# Patient Record
Sex: FEMALE | Race: OTHER | NOT HISPANIC OR LATINO | URBAN - METROPOLITAN AREA
[De-identification: names, ages, dates, MRNs, and addresses within clinical notes are randomized per-mention and may not be internally consistent; named-entity substitution may affect disease eponyms.]

---

## 2019-10-28 VITALS
OXYGEN SATURATION: 100 % | RESPIRATION RATE: 16 BRPM | TEMPERATURE: 99 F | SYSTOLIC BLOOD PRESSURE: 113 MMHG | DIASTOLIC BLOOD PRESSURE: 75 MMHG | HEART RATE: 76 BPM

## 2019-10-28 RX ORDER — IBUPROFEN 200 MG
0 TABLET ORAL
Qty: 0 | Refills: 0 | DISCHARGE

## 2019-10-28 NOTE — H&P ADULT - ASSESSMENT
29 y/o Female with Morphine allergy (hives) and PMHx of RUE AVM who presents to [facility] for recommended DSE of RUE AVM. Pt reports she has had a DSE of her RUE AVM once in the past at age 12. She notes that her only complaints with regard to her AVM are the inability to straighten her right arm as a result of her previous procedure and pain described as a "HA in her arm" in her axilla and antecubital fossa. She also notes that if she avoids high inflammation foods, late night eating, and ETOH, her pain is typically well controlled. She takes Advil PRN for pain. Pt denies skin changes, diminished strength, numbness, tingling, swelling, or any other changes at this time. She underwent an MRI Humerus w/, w/o contrast on 6/25/19 revealing large AVM extending from lateral border of the pectoralis major muscle through the upper arm and midarm along the medial and posterior borders of the biceps muscle and insinuating muscle itself, which compared to a previous study in 2002 appears smaller but still present. Pt now presents to [facility] for DSE of RUE AVM with Dr. Peña.    [facility] 10/28/19; [facility] [place], [place] ..   Pt. to be consented for procedure by Dr. Peña's team. 29 y/o Female with Morphine allergy (hives) and PMHx of RUE AVM who presents to [facility] for recommended DSE of RUE AVM. Pt reports she has had a DSE of her RUE AVM once in the past at age 12. She notes that her only complaints with regard to her AVM are the inability to straighten her right arm as a result of her previous procedure and pain described as a "HA in her arm" in her axilla and antecubital fossa. She also notes that if she avoids high inflammation foods, late night eating, and ETOH, her pain is typically well controlled. She takes Advil PRN for pain. Pt denies skin changes, diminished strength, numbness, tingling, swelling, or any other changes at this time. She underwent an MRI Humerus w/, w/o contrast on 6/25/19 revealing large AVM extending from lateral border of the pectoralis major muscle through the upper arm and midarm along the medial and posterior borders of the biceps muscle and insinuating muscle itself, which compared to a previous study in 2002 appears smaller but still present. Pt now presents to [facility] for DSE of RUE AVM with Dr. Peña.    [facility] 10/28/19; [place] [facility] negative.   Pt. to be consented for procedure by Dr. Peña's team.

## 2019-10-28 NOTE — H&P ADULT - HISTORY OF PRESENT ILLNESS
29 y/o Female with Morphine allergy (hives) and PMHx of RUE AVM who presents to [facility] for recommended DSE of RUE AVM. Pt reports she has had a DSE of her RUE AVM once in the past at age 12. She notes that her only complaints with regard to her AVM are the inability to straighten her right arm as a result of her previous procedure and pain described as a "HA in her arm" in her axilla and antecubital fossa. She also notes that if she avoids high inflammation foods, late night eating, and ETOH, her pain is typically well controlled. She takes Advil PRN for pain. Pt denies skin changes, diminished strength, numbness, tingling, swelling, or any other changes at this time. She underwent an MRI Humerus w/, w/o contrast on 6/25/19 revealing large AVM extending from lateral border of the pectoralis major muscle through the upper arm and midarm along the medial and posterior borders of the biceps muscle and insinuating muscle itself, which compared to a previous study in 2002 appears smaller but still present.  Pt now presents to [facility] for DSE of RUE AVM with Dr. Peña.    [facility] 10/28/19

## 2019-10-28 NOTE — H&P ADULT - NSHPLABSRESULTS_GEN_ALL_CORE
13.6   5.98  )-----------( 187      ( 29 Oct 2019 09:34 )             39.3   PT/INR - ( 29 Oct 2019 09:34 )   PT: 11.2 sec;   INR: 0.99          PTT - ( 29 Oct 2019 09:34 )  PTT:28.0 sec 13.6   5.98  )-----------( 187      ( 29 Oct 2019 09:34 )             39.3   PT/INR - ( 29 Oct 2019 09:34 )   PT: 11.2 sec;   INR: 0.99          PTT - ( 29 Oct 2019 09:34 )  PTT:28.0 sec    10-29    141  |  106  |  11  ----------------------------<  88  3.8   |  25  |  0.79    Ca    8.7      29 Oct 2019 09:34    TPro  6.4  /  Alb  4.3  /  TBili  0.4  /  DBili  x   /  AST  14  /  ALT  14  /  AlkPhos  60  10-29

## 2019-10-28 NOTE — H&P ADULT - RS GEN PE MLT RESP DETAILS PC
normal/airway patent/no chest wall tenderness/no intercostal retractions/no rales/no subcutaneous emphysema/no rhonchi/breath sounds equal/clear to auscultation bilaterally/good air movement/respirations non-labored

## 2019-10-29 ENCOUNTER — INPATIENT (INPATIENT)
Facility: HOSPITAL | Age: 29
LOS: 0 days | Discharge: HOME | DRG: 254 | End: 2019-10-30
Attending: RADIOLOGY | Admitting: RADIOLOGY
Payer: COMMERCIAL

## 2019-10-29 DIAGNOSIS — Z98.890 OTHER SPECIFIED POSTPROCEDURAL STATES: Chronic | ICD-10-CM

## 2019-10-29 LAB
ALBUMIN SERPL ELPH-MCNC: 4.3 G/DL — SIGNIFICANT CHANGE UP (ref 3.3–5)
ALP SERPL-CCNC: 60 U/L — SIGNIFICANT CHANGE UP (ref 40–120)
ALT FLD-CCNC: 14 U/L — SIGNIFICANT CHANGE UP (ref 10–45)
ANION GAP SERPL CALC-SCNC: 10 MMOL/L — SIGNIFICANT CHANGE UP (ref 5–17)
APTT BLD: 28 SEC — SIGNIFICANT CHANGE UP (ref 27.5–36.3)
AST SERPL-CCNC: 14 U/L — SIGNIFICANT CHANGE UP (ref 10–40)
BASOPHILS # BLD AUTO: 0.02 K/UL — SIGNIFICANT CHANGE UP (ref 0–0.2)
BASOPHILS NFR BLD AUTO: 0.3 % — SIGNIFICANT CHANGE UP (ref 0–2)
BILIRUB SERPL-MCNC: 0.4 MG/DL — SIGNIFICANT CHANGE UP (ref 0.2–1.2)
BUN SERPL-MCNC: 11 MG/DL — SIGNIFICANT CHANGE UP (ref 7–23)
CALCIUM SERPL-MCNC: 8.7 MG/DL — SIGNIFICANT CHANGE UP (ref 8.4–10.5)
CHLORIDE SERPL-SCNC: 106 MMOL/L — SIGNIFICANT CHANGE UP (ref 96–108)
CO2 SERPL-SCNC: 25 MMOL/L — SIGNIFICANT CHANGE UP (ref 22–31)
CREAT SERPL-MCNC: 0.79 MG/DL — SIGNIFICANT CHANGE UP (ref 0.5–1.3)
EOSINOPHIL # BLD AUTO: 0.12 K/UL — SIGNIFICANT CHANGE UP (ref 0–0.5)
EOSINOPHIL NFR BLD AUTO: 2 % — SIGNIFICANT CHANGE UP (ref 0–6)
GLUCOSE SERPL-MCNC: 88 MG/DL — SIGNIFICANT CHANGE UP (ref 70–99)
HCG SERPL-ACNC: <0 MIU/ML — SIGNIFICANT CHANGE UP
HCT VFR BLD CALC: 39.3 % — SIGNIFICANT CHANGE UP (ref 34.5–45)
HGB BLD-MCNC: 13.6 G/DL — SIGNIFICANT CHANGE UP (ref 11.5–15.5)
IMM GRANULOCYTES NFR BLD AUTO: 0.3 % — SIGNIFICANT CHANGE UP (ref 0–1.5)
INR BLD: 0.99 — SIGNIFICANT CHANGE UP (ref 0.88–1.16)
LYMPHOCYTES # BLD AUTO: 2.79 K/UL — SIGNIFICANT CHANGE UP (ref 1–3.3)
LYMPHOCYTES # BLD AUTO: 46.7 % — HIGH (ref 13–44)
MCHC RBC-ENTMCNC: 30.1 PG — SIGNIFICANT CHANGE UP (ref 27–34)
MCHC RBC-ENTMCNC: 34.6 GM/DL — SIGNIFICANT CHANGE UP (ref 32–36)
MCV RBC AUTO: 86.9 FL — SIGNIFICANT CHANGE UP (ref 80–100)
MONOCYTES # BLD AUTO: 0.45 K/UL — SIGNIFICANT CHANGE UP (ref 0–0.9)
MONOCYTES NFR BLD AUTO: 7.5 % — SIGNIFICANT CHANGE UP (ref 2–14)
NEUTROPHILS # BLD AUTO: 2.58 K/UL — SIGNIFICANT CHANGE UP (ref 1.8–7.4)
NEUTROPHILS NFR BLD AUTO: 43.2 % — SIGNIFICANT CHANGE UP (ref 43–77)
NRBC # BLD: 0 /100 WBCS — SIGNIFICANT CHANGE UP (ref 0–0)
PLATELET # BLD AUTO: 187 K/UL — SIGNIFICANT CHANGE UP (ref 150–400)
POTASSIUM SERPL-MCNC: 3.8 MMOL/L — SIGNIFICANT CHANGE UP (ref 3.5–5.3)
POTASSIUM SERPL-SCNC: 3.8 MMOL/L — SIGNIFICANT CHANGE UP (ref 3.5–5.3)
PROT SERPL-MCNC: 6.4 G/DL — SIGNIFICANT CHANGE UP (ref 6–8.3)
PROTHROM AB SERPL-ACNC: 11.2 SEC — SIGNIFICANT CHANGE UP (ref 10–12.9)
RBC # BLD: 4.52 M/UL — SIGNIFICANT CHANGE UP (ref 3.8–5.2)
RBC # FLD: 12.2 % — SIGNIFICANT CHANGE UP (ref 10.3–14.5)
SODIUM SERPL-SCNC: 141 MMOL/L — SIGNIFICANT CHANGE UP (ref 135–145)
WBC # BLD: 5.98 K/UL — SIGNIFICANT CHANGE UP (ref 3.8–10.5)
WBC # FLD AUTO: 5.98 K/UL — SIGNIFICANT CHANGE UP (ref 3.8–10.5)

## 2019-10-29 PROCEDURE — 76937 US GUIDE VASCULAR ACCESS: CPT | Mod: 26

## 2019-10-29 PROCEDURE — 37241 VASC EMBOLIZE/OCCLUDE VENOUS: CPT

## 2019-10-29 PROCEDURE — 93010 ELECTROCARDIOGRAM REPORT: CPT

## 2019-10-29 RX ORDER — KETOROLAC TROMETHAMINE 30 MG/ML
30 SYRINGE (ML) INJECTION EVERY 6 HOURS
Refills: 0 | Status: DISCONTINUED | OUTPATIENT
Start: 2019-10-29 | End: 2019-10-30

## 2019-10-29 RX ORDER — ONDANSETRON 8 MG/1
4 TABLET, FILM COATED ORAL EVERY 4 HOURS
Refills: 0 | Status: DISCONTINUED | OUTPATIENT
Start: 2019-10-29 | End: 2019-10-30

## 2019-10-29 RX ORDER — INFLUENZA VIRUS VACCINE 15; 15; 15; 15 UG/.5ML; UG/.5ML; UG/.5ML; UG/.5ML
0.5 SUSPENSION INTRAMUSCULAR ONCE
Refills: 0 | Status: DISCONTINUED | OUTPATIENT
Start: 2019-10-29 | End: 2019-10-30

## 2019-10-29 RX ORDER — CEFAZOLIN SODIUM 1 G
1000 VIAL (EA) INJECTION EVERY 8 HOURS
Refills: 0 | Status: DISCONTINUED | OUTPATIENT
Start: 2019-10-29 | End: 2019-10-30

## 2019-10-29 RX ADMIN — Medication 30 MILLIGRAM(S): at 16:47

## 2019-10-29 RX ADMIN — Medication 40 MILLIGRAM(S): at 21:54

## 2019-10-29 RX ADMIN — Medication 100 MILLIGRAM(S): at 22:00

## 2019-10-29 RX ADMIN — Medication 30 MILLIGRAM(S): at 16:13

## 2019-10-29 NOTE — BRIEF OPERATIVE NOTE - COMMENTS
Bedrest until fully awake then activity as tolerated. Prednisone at bedtime and in am. Ancef 1q q8h x 3 doses. Pain meds PRN.

## 2019-10-29 NOTE — BRIEF OPERATIVE NOTE - OPERATION/FINDINGS
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

## 2019-10-30 ENCOUNTER — TRANSCRIPTION ENCOUNTER (OUTPATIENT)
Age: 29
End: 2019-10-30

## 2019-10-30 VITALS — TEMPERATURE: 98 F

## 2019-10-30 LAB
ANION GAP SERPL CALC-SCNC: 11 MMOL/L — SIGNIFICANT CHANGE UP (ref 5–17)
BUN SERPL-MCNC: 12 MG/DL — SIGNIFICANT CHANGE UP (ref 7–23)
CALCIUM SERPL-MCNC: 8.8 MG/DL — SIGNIFICANT CHANGE UP (ref 8.4–10.5)
CHLORIDE SERPL-SCNC: 107 MMOL/L — SIGNIFICANT CHANGE UP (ref 96–108)
CO2 SERPL-SCNC: 22 MMOL/L — SIGNIFICANT CHANGE UP (ref 22–31)
CREAT SERPL-MCNC: 0.68 MG/DL — SIGNIFICANT CHANGE UP (ref 0.5–1.3)
GLUCOSE SERPL-MCNC: 143 MG/DL — HIGH (ref 70–99)
HCT VFR BLD CALC: 37.9 % — SIGNIFICANT CHANGE UP (ref 34.5–45)
HGB BLD-MCNC: 13 G/DL — SIGNIFICANT CHANGE UP (ref 11.5–15.5)
MCHC RBC-ENTMCNC: 29.8 PG — SIGNIFICANT CHANGE UP (ref 27–34)
MCHC RBC-ENTMCNC: 34.3 GM/DL — SIGNIFICANT CHANGE UP (ref 32–36)
MCV RBC AUTO: 86.9 FL — SIGNIFICANT CHANGE UP (ref 80–100)
NRBC # BLD: 0 /100 WBCS — SIGNIFICANT CHANGE UP (ref 0–0)
PLATELET # BLD AUTO: 171 K/UL — SIGNIFICANT CHANGE UP (ref 150–400)
POTASSIUM SERPL-MCNC: 4.3 MMOL/L — SIGNIFICANT CHANGE UP (ref 3.5–5.3)
POTASSIUM SERPL-SCNC: 4.3 MMOL/L — SIGNIFICANT CHANGE UP (ref 3.5–5.3)
RBC # BLD: 4.36 M/UL — SIGNIFICANT CHANGE UP (ref 3.8–5.2)
RBC # FLD: 11.9 % — SIGNIFICANT CHANGE UP (ref 10.3–14.5)
SODIUM SERPL-SCNC: 140 MMOL/L — SIGNIFICANT CHANGE UP (ref 135–145)
WBC # BLD: 12.42 K/UL — HIGH (ref 3.8–10.5)
WBC # FLD AUTO: 12.42 K/UL — HIGH (ref 3.8–10.5)

## 2019-10-30 PROCEDURE — 99238 HOSP IP/OBS DSCHRG MGMT 30/<: CPT

## 2019-10-30 RX ORDER — KETOROLAC TROMETHAMINE 30 MG/ML
1 SYRINGE (ML) INJECTION
Qty: 30 | Refills: 0
Start: 2019-10-30 | End: 2019-11-03

## 2019-10-30 RX ORDER — CEPHALEXIN 500 MG
1 CAPSULE ORAL
Qty: 28 | Refills: 0
Start: 2019-10-30 | End: 2019-11-05

## 2019-10-30 RX ADMIN — Medication 30 MILLIGRAM(S): at 06:41

## 2019-10-30 RX ADMIN — Medication 20 MILLIGRAM(S): at 11:22

## 2019-10-30 RX ADMIN — Medication 30 MILLIGRAM(S): at 06:56

## 2019-10-30 RX ADMIN — Medication 100 MILLIGRAM(S): at 06:40

## 2019-10-30 NOTE — DISCHARGE NOTE NURSING/CASE MANAGEMENT/SOCIAL WORK - PATIENT PORTAL LINK FT
You can access the FollowMyHealth Patient Portal offered by [facility] by registering at the following website: [email or web address]. By joining [facility]’s FollowMyHealth portal, you will also be able to view your health information using other applications (apps) compatible with our system.

## 2019-10-30 NOTE — DISCHARGE NOTE PROVIDER - HOSPITAL COURSE
27 y/o Female with Morphine allergy (hives) and PMHx of RUE AVM who presents to [facility] for recommended DSE of RUE AVM. Pt reports she has had a DSE of her RUE AVM once in the past at age 12. She notes that her only complaints with regard to her AVM are the inability to straighten her right arm as a result of her previous procedure and pain described as a "HA in her arm" in her axilla and antecubital fossa. She also notes that if she avoids high inflammation foods, late night eating, and ETOH, her pain is typically well controlled. She takes Advil PRN for pain. Pt denies skin changes, diminished strength, numbness, tingling, swelling, or any other changes at this time. She underwent an MRI Humerus w/, w/o contrast on 6/25/19 revealing large AVM extending from lateral border of the pectoralis major muscle through the upper arm and midarm along the medial and posterior borders of the biceps muscle and insinuating muscle itself, which compared to a previous study in 2002 appears smaller but still present. Pt now presents to [facility] for DSE of RUE AVM with Dr. Peña. Pt now s/p DSE right arm/axilla of venous malformation and admitted to [address] [place] [address] overnight for observation. Of note s/p prednisone 40 mg x1 and prednisone 20 mg x1. Pt e-prescribed prophylactic antibiotics keflex 500 q4hrs x7 day course.     Pt deemed stable for discharge per Dr. Peña and will follow up with Dr. Peña in 6 weeks. 27 y/o Female with Morphine allergy (hives) and PMHx of RUE AVM who presents to [facility] for recommended DSE of RUE AVM. Pt reports she has had a DSE of her RUE AVM once in the past at age 12. She notes that her only complaints with regard to her AVM are the inability to straighten her right arm as a result of her previous procedure and pain described as a "HA in her arm" in her axilla and antecubital fossa. She also notes that if she avoids high inflammation foods, late night eating, and ETOH, her pain is typically well controlled. She takes Advil PRN for pain. Pt denies skin changes, diminished strength, numbness, tingling, swelling, or any other changes at this time. She underwent an MRI Humerus w/, w/o contrast on 6/25/19 revealing large AVM extending from lateral border of the pectoralis major muscle through the upper arm and midarm along the medial and posterior borders of the biceps muscle and insinuating muscle itself, which compared to a previous study in 2002 appears smaller but still present. Pt now presents to [facility] for DSE of RUE AVM with Dr. Peña. Pt now s/p DSE right arm/axilla of venous malformation and admitted to [address] [place] [address] overnight for observation. Of note s/p prednisone 40 mg x1 and prednisone 20 mg x1. Pt e-prescribed prophylactic antibiotics keflex 500 q4hrs x7 day course and toradol 10 mg PO q4-6 hr PRN. Pt deemed stable for discharge per Dr. Peña and will follow up with Dr. Peña in 6 weeks.

## 2019-10-30 NOTE — DISCHARGE NOTE PROVIDER - CARE PROVIDER_API CALL
Art Peña)  Diagnostic Radiology  [address] [place] [address]  Phone: [phone]  Fax: [phone]  Follow Up Time:

## 2019-10-30 NOTE — DISCHARGE NOTE PROVIDER - NSDCCPCAREPLAN_GEN_ALL_CORE_FT
PRINCIPAL DISCHARGE DIAGNOSIS  Diagnosis: Venous malformation  Assessment and Plan of Treatment: You underwent a direct stick embolization to the venous malformation in your right arm. Please complete the 7 day course of prophylactic antibiotic keflex 500 mg 4 times a day. Please follow up with Dr. Peña in PRINCIPAL DISCHARGE DIAGNOSIS  Diagnosis: Venous malformation  Assessment and Plan of Treatment: You underwent a direct stick embolization to the venous malformation in your right arm. Please complete the 7 day course of prophylactic antibiotic keflex 500 mg 4 times a day. Toradol (ketorolac) 10 mg every 4-6 hours as needed for pain relief. Please do not take toradol and advil at the same time as it will increase the risk of bleeding. Please follow up with Dr. Peña in 6 weeks.

## 2019-10-30 NOTE — DISCHARGE NOTE PROVIDER - NSDCMRMEDTOKEN_GEN_ALL_CORE_FT
Advil 200 mg oral tablet: tab(s) orally every 6 hours, As Needed Advil 200 mg oral tablet: tab(s) orally every 6 hours, As Needed  Keflex 500 mg oral capsule: 1 cap(s) orally 4 times a day   ketorolac 10 mg oral tablet: 1 tab(s) orally every 4 hours, As Needed

## 2019-11-07 DIAGNOSIS — Z88.5 ALLERGY STATUS TO NARCOTIC AGENT: ICD-10-CM

## 2019-11-07 DIAGNOSIS — Q27.31 ARTERIOVENOUS MALFORMATION OF VESSEL OF UPPER LIMB: ICD-10-CM

## 2019-11-07 DIAGNOSIS — Z80.3 FAMILY HISTORY OF MALIGNANT NEOPLASM OF BREAST: ICD-10-CM

## 2019-12-05 PROCEDURE — 85610 PROTHROMBIN TIME: CPT

## 2019-12-05 PROCEDURE — 80048 BASIC METABOLIC PNL TOTAL CA: CPT

## 2019-12-05 PROCEDURE — 84702 CHORIONIC GONADOTROPIN TEST: CPT

## 2019-12-05 PROCEDURE — 93005 ELECTROCARDIOGRAM TRACING: CPT

## 2019-12-05 PROCEDURE — 85025 COMPLETE CBC W/AUTO DIFF WBC: CPT

## 2019-12-05 PROCEDURE — A9698: CPT

## 2019-12-05 PROCEDURE — C1889: CPT

## 2019-12-05 PROCEDURE — 80053 COMPREHEN METABOLIC PANEL: CPT

## 2019-12-05 PROCEDURE — 85027 COMPLETE CBC AUTOMATED: CPT

## 2019-12-05 PROCEDURE — 36415 COLL VENOUS BLD VENIPUNCTURE: CPT

## 2019-12-05 PROCEDURE — 85730 THROMBOPLASTIN TIME PARTIAL: CPT

## 2023-01-09 ENCOUNTER — APPOINTMENT (OUTPATIENT)
Dept: HEART AND VASCULAR | Facility: CLINIC | Age: 33
End: 2023-01-09

## 2023-07-24 PROBLEM — Z00.00 ENCOUNTER FOR PREVENTIVE HEALTH EXAMINATION: Status: ACTIVE | Noted: 2023-07-24

## 2023-10-19 PROBLEM — Q27.30 ARTERIOVENOUS MALFORMATION, SITE UNSPECIFIED: Chronic | Status: ACTIVE | Noted: 2019-10-28

## 2023-11-13 ENCOUNTER — APPOINTMENT (OUTPATIENT)
Dept: HEART AND VASCULAR | Facility: CLINIC | Age: 33
End: 2023-11-13